# Patient Record
Sex: FEMALE | Race: OTHER | HISPANIC OR LATINO | Employment: FULL TIME | URBAN - METROPOLITAN AREA
[De-identification: names, ages, dates, MRNs, and addresses within clinical notes are randomized per-mention and may not be internally consistent; named-entity substitution may affect disease eponyms.]

---

## 2018-03-15 ENCOUNTER — HOSPITAL ENCOUNTER (EMERGENCY)
Facility: HOSPITAL | Age: 28
Discharge: HOME | End: 2018-03-15
Attending: EMERGENCY MEDICINE | Admitting: EMERGENCY MEDICINE
Payer: COMMERCIAL

## 2018-03-15 VITALS
DIASTOLIC BLOOD PRESSURE: 65 MMHG | TEMPERATURE: 98.7 F | OXYGEN SATURATION: 100 % | HEART RATE: 92 BPM | SYSTOLIC BLOOD PRESSURE: 103 MMHG | WEIGHT: 140 LBS | RESPIRATION RATE: 18 BRPM

## 2018-03-15 DIAGNOSIS — B34.9 VIRAL SYNDROME: ICD-10-CM

## 2018-03-15 DIAGNOSIS — R11.10 VOMITING: Primary | ICD-10-CM

## 2018-03-15 LAB
ANION GAP SERPL CALCULATED.3IONS-SCNC: 6 MMOL/L (ref 4–13)
BACTERIA UR QL AUTO: ABNORMAL /HPF
BASOPHILS # BLD AUTO: 0.01 THOUSANDS/ΜL (ref 0–0.1)
BASOPHILS NFR BLD AUTO: 0 % (ref 0–1)
BILIRUB UR QL STRIP: NEGATIVE
BUN SERPL-MCNC: 9 MG/DL (ref 5–25)
CALCIUM SERPL-MCNC: 8.4 MG/DL (ref 8.3–10.1)
CHLORIDE SERPL-SCNC: 103 MMOL/L (ref 100–108)
CLARITY UR: ABNORMAL
CO2 SERPL-SCNC: 29 MMOL/L (ref 21–32)
COLOR UR: YELLOW
COLOR, POC: NORMAL
CREAT SERPL-MCNC: 0.72 MG/DL (ref 0.6–1.3)
EOSINOPHIL # BLD AUTO: 0 THOUSAND/ΜL (ref 0–0.61)
EOSINOPHIL NFR BLD AUTO: 0 % (ref 0–6)
ERYTHROCYTE [DISTWIDTH] IN BLOOD BY AUTOMATED COUNT: 12.5 % (ref 11.6–15.1)
EXT PREG TEST URINE: NEGATIVE
GFR SERPL CREATININE-BSD FRML MDRD: 114 ML/MIN/1.73SQ M
GLUCOSE SERPL-MCNC: 126 MG/DL (ref 65–140)
GLUCOSE UR STRIP-MCNC: NEGATIVE MG/DL
HCT VFR BLD AUTO: 40.2 % (ref 34.8–46.1)
HGB BLD-MCNC: 13.6 G/DL (ref 11.5–15.4)
HGB UR QL STRIP.AUTO: ABNORMAL
HYALINE CASTS #/AREA URNS LPF: ABNORMAL /LPF
KETONES UR STRIP-MCNC: NEGATIVE MG/DL
LEUKOCYTE ESTERASE UR QL STRIP: NEGATIVE
LYMPHOCYTES # BLD AUTO: 0.49 THOUSANDS/ΜL (ref 0.6–4.47)
LYMPHOCYTES NFR BLD AUTO: 5 % (ref 14–44)
MCH RBC QN AUTO: 29.7 PG (ref 26.8–34.3)
MCHC RBC AUTO-ENTMCNC: 33.8 G/DL (ref 31.4–37.4)
MCV RBC AUTO: 88 FL (ref 82–98)
MONOCYTES # BLD AUTO: 0.59 THOUSAND/ΜL (ref 0.17–1.22)
MONOCYTES NFR BLD AUTO: 6 % (ref 4–12)
NEUTROPHILS # BLD AUTO: 8.95 THOUSANDS/ΜL (ref 1.85–7.62)
NEUTS SEG NFR BLD AUTO: 89 % (ref 43–75)
NITRITE UR QL STRIP: NEGATIVE
NON-SQ EPI CELLS URNS QL MICRO: ABNORMAL /HPF
NRBC BLD AUTO-RTO: 0 /100 WBCS
PH UR STRIP.AUTO: 5.5 [PH] (ref 4.5–8)
PLATELET # BLD AUTO: 216 THOUSANDS/UL (ref 149–390)
PMV BLD AUTO: 9.7 FL (ref 8.9–12.7)
POTASSIUM SERPL-SCNC: 3.5 MMOL/L (ref 3.5–5.3)
PROT UR STRIP-MCNC: ABNORMAL MG/DL
RBC # BLD AUTO: 4.58 MILLION/UL (ref 3.81–5.12)
RBC #/AREA URNS AUTO: ABNORMAL /HPF
SODIUM SERPL-SCNC: 138 MMOL/L (ref 136–145)
SP GR UR STRIP.AUTO: 1.02 (ref 1–1.03)
UROBILINOGEN UR QL STRIP.AUTO: 1 E.U./DL
WBC # BLD AUTO: 10.06 THOUSAND/UL (ref 4.31–10.16)
WBC #/AREA URNS AUTO: ABNORMAL /HPF

## 2018-03-15 PROCEDURE — 96374 THER/PROPH/DIAG INJ IV PUSH: CPT

## 2018-03-15 PROCEDURE — 96375 TX/PRO/DX INJ NEW DRUG ADDON: CPT

## 2018-03-15 PROCEDURE — 36415 COLL VENOUS BLD VENIPUNCTURE: CPT | Performed by: EMERGENCY MEDICINE

## 2018-03-15 PROCEDURE — 80048 BASIC METABOLIC PNL TOTAL CA: CPT | Performed by: EMERGENCY MEDICINE

## 2018-03-15 PROCEDURE — 81002 URINALYSIS NONAUTO W/O SCOPE: CPT | Performed by: EMERGENCY MEDICINE

## 2018-03-15 PROCEDURE — 81025 URINE PREGNANCY TEST: CPT | Performed by: EMERGENCY MEDICINE

## 2018-03-15 PROCEDURE — 81001 URINALYSIS AUTO W/SCOPE: CPT

## 2018-03-15 PROCEDURE — 85025 COMPLETE CBC W/AUTO DIFF WBC: CPT | Performed by: EMERGENCY MEDICINE

## 2018-03-15 PROCEDURE — 99283 EMERGENCY DEPT VISIT LOW MDM: CPT

## 2018-03-15 PROCEDURE — 96361 HYDRATE IV INFUSION ADD-ON: CPT

## 2018-03-15 RX ORDER — ONDANSETRON 4 MG/1
4 TABLET, FILM COATED ORAL EVERY 8 HOURS PRN
Qty: 12 TABLET | Refills: 0 | Status: SHIPPED | OUTPATIENT
Start: 2018-03-15

## 2018-03-15 RX ORDER — ONDANSETRON 2 MG/ML
4 INJECTION INTRAMUSCULAR; INTRAVENOUS ONCE
Status: COMPLETED | OUTPATIENT
Start: 2018-03-15 | End: 2018-03-15

## 2018-03-15 RX ORDER — KETOROLAC TROMETHAMINE 30 MG/ML
15 INJECTION, SOLUTION INTRAMUSCULAR; INTRAVENOUS ONCE
Status: COMPLETED | OUTPATIENT
Start: 2018-03-15 | End: 2018-03-15

## 2018-03-15 RX ADMIN — SODIUM CHLORIDE 1000 ML: 0.9 INJECTION, SOLUTION INTRAVENOUS at 21:47

## 2018-03-15 RX ADMIN — ONDANSETRON 4 MG: 2 INJECTION INTRAMUSCULAR; INTRAVENOUS at 21:47

## 2018-03-15 RX ADMIN — KETOROLAC TROMETHAMINE 15 MG: 30 INJECTION, SOLUTION INTRAMUSCULAR at 22:44

## 2018-03-16 NOTE — ED ATTENDING ATTESTATION
Viviane MOORE DO, saw and evaluated the patient  I have discussed the patient with the resident/non-physician practitioner and agree with the resident's/non-physician practitioner's findings, Plan of Care, and MDM as documented in the resident's/non-physician practitioner's note, except where noted  All available labs and Radiology studies were reviewed  At this point I agree with the current assessment done in the Emergency Department  I have conducted an independent evaluation of this patient a history and physical is as follows:      Critical Care Time  CritCare Time    Procedures     28 yr old fem to the ED with vomiting since 1 am wo diarrhea   with the same yesterday  Denies preg  Exm: slight pale  Lungs: cta  Oral moist   Abd: soft and no significant tenderness  No distention or tympany  Pln: fluids and sx tx

## 2018-03-16 NOTE — ED PROVIDER NOTES
History  Chief Complaint   Patient presents with    Vomiting     since this morning around 1 am  patient reports body aches, and chills  denies diarrhea     This is a 21-year-old female that presents today with vomiting  Sick contacts at home include her  who was seen here yesterday  She states no abdominal pain  No diarrhea constipation  States she is vomiting solids and liquids  Can't keep anything down  Denies any headaches  No lightheadedness or dizziness  No chest pain or shortness of breath  No abdominal surgeries  Denies pregnancy  States no fevers but some mild chills  21-year-old female presenting with vomiting  Will get basic labs to check for electrolytes anemia hydrate and discharge accordingly            None       No past medical history on file  No past surgical history on file  No family history on file  I have reviewed and agree with the history as documented  Social History   Substance Use Topics    Smoking status: Never Smoker    Smokeless tobacco: Never Used    Alcohol use No        Review of Systems   Constitutional: Positive for chills  Negative for fever  HENT: Negative  Eyes: Negative  Respiratory: Negative  Cardiovascular: Negative  Gastrointestinal: Positive for abdominal pain, nausea and vomiting  Negative for constipation and diarrhea  Endocrine: Negative  Genitourinary: Negative  Musculoskeletal: Negative  Skin: Negative  Neurological: Negative  Hematological: Negative  Psychiatric/Behavioral: Negative  All other systems reviewed and are negative        Physical Exam  ED Triage Vitals   Temperature Pulse Respirations Blood Pressure SpO2   03/15/18 2051 03/15/18 2051 03/15/18 2051 03/15/18 2051 03/15/18 2051   98 7 °F (37 1 °C) (!) 53 18 109/57 100 %      Temp Source Heart Rate Source Patient Position - Orthostatic VS BP Location FiO2 (%)   03/15/18 2051 03/15/18 2051 03/15/18 2051 03/15/18 2051 --   Oral Monitor Sitting Left arm       Pain Score       03/15/18 2244       5           Orthostatic Vital Signs  Vitals:    03/15/18 2051 03/15/18 2159 03/15/18 2244   BP: 109/57 99/57 103/65   Pulse: (!) 53 80 92   Patient Position - Orthostatic VS: Sitting Sitting Sitting       Physical Exam   Constitutional: She is oriented to person, place, and time  She appears well-developed and well-nourished  No distress  HENT:   Head: Normocephalic  Nose: Nose normal    Mouth/Throat: Oropharynx is clear and moist  No oropharyngeal exudate  Eyes: Conjunctivae and EOM are normal  Pupils are equal, round, and reactive to light  Neck: Normal range of motion  Neck supple  Cardiovascular: Normal rate, regular rhythm and normal heart sounds  No murmur heard  Pulmonary/Chest: Effort normal and breath sounds normal  No respiratory distress  She has no wheezes  Abdominal: Soft  Bowel sounds are normal  She exhibits no distension  There is no tenderness  There is no guarding  Musculoskeletal: Normal range of motion  She exhibits no edema, tenderness or deformity  Neurological: She is alert and oriented to person, place, and time  Skin: Skin is warm  Capillary refill takes less than 2 seconds  She is not diaphoretic  Psychiatric: She has a normal mood and affect  Vitals reviewed        ED Medications  Medications   sodium chloride 0 9 % bolus 1,000 mL (0 mL Intravenous Stopped 3/15/18 2245)   ondansetron (ZOFRAN) injection 4 mg (4 mg Intravenous Given 3/15/18 2147)   ketorolac (TORADOL) injection 15 mg (15 mg Intravenous Given 3/15/18 2244)       Diagnostic Studies  Results Reviewed     Procedure Component Value Units Date/Time    Urine Microscopic [[id number]]  (Abnormal) Collected:  03/15/18 2145    Lab Status:  Final result Specimen:  Urine from Urine, Clean Catch Updated:  03/15/18 2219     RBC, UA 4-10 (A) /hpf      WBC, UA None Seen /hpf      Epithelial Cells None Seen /hpf      Bacteria, UA None Seen /hpf      Hyaline Casts, UA 3-5 (A) /lpf     Basic metabolic panel [[id number]] Collected:  03/15/18 2146    Lab Status:  Final result Specimen:  Blood from Arm, Left Updated:  03/15/18 2211     Sodium 138 mmol/L      Potassium 3 5 mmol/L      Chloride 103 mmol/L      CO2 29 mmol/L      Anion Gap 6 mmol/L      BUN 9 mg/dL      Creatinine 0 72 mg/dL      Glucose 126 mg/dL      Calcium 8 4 mg/dL      eGFR 114 ml/min/1 73sq m     Narrative:         National Kidney Disease Education Program recommendations are as follows:  GFR calculation is accurate only with a steady state creatinine  Chronic Kidney disease less than 60 ml/min/1 73 sq  meters  Kidney failure less than 15 ml/min/1 73 sq  meters      POCT urinalysis dipstick [[id number]]  (Normal) Resulted:  03/15/18 2157    Lab Status:  Final result Specimen:  Urine Updated:  03/15/18 2157     Color, UA See chart    POCT pregnancy, urine [[id number]]  (Normal) Resulted:  03/15/18 2156    Lab Status:  Final result Updated:  03/15/18 2157     EXT PREG TEST UR (Ref: Negative) Negative    CBC and differential [[id number]]  (Abnormal) Collected:  03/15/18 2146    Lab Status:  Final result Specimen:  Blood from Arm, Left Updated:  03/15/18 2156     WBC 10 06 Thousand/uL      RBC 4 58 Million/uL      Hemoglobin 13 6 g/dL      Hematocrit 40 2 %      MCV 88 fL      MCH 29 7 pg      MCHC 33 8 g/dL      RDW 12 5 %      MPV 9 7 fL      Platelets 456 Thousands/uL      nRBC 0 /100 WBCs      Neutrophils Relative 89 (H) %      Lymphocytes Relative 5 (L) %      Monocytes Relative 6 %      Eosinophils Relative 0 %      Basophils Relative 0 %      Neutrophils Absolute 8 95 (H) Thousands/µL      Lymphocytes Absolute 0 49 (L) Thousands/µL      Monocytes Absolute 0 59 Thousand/µL      Eosinophils Absolute 0 00 Thousand/µL      Basophils Absolute 0 01 Thousands/µL     ED Urine Macroscopic [[id number]]  (Abnormal) Collected:  03/15/18 2145    Lab Status:  Final result Specimen:  Urine Updated:  03/15/18 2145     Color, UA Yellow Clarity, UA Slightly Cloudy     pH, UA 5 5     Leukocytes, UA Negative     Nitrite, UA Negative     Protein, UA Trace (A) mg/dl      Glucose, UA Negative mg/dl      Ketones, UA Negative mg/dl      Urobilinogen, UA 1 0 E U /dl      Bilirubin, UA Negative     Blood, UA Moderate (A)     Specific [place], UA 1 025    Narrative:       CLINITEK RESULT                 No orders to display         Procedures  Procedures      Phone Consults  ED Phone Contact    ED Course  ED Course as of Mar 16 0018   Thu Mar 15, 2018   2230 States feeling better, will discharge                                 MDM  CritCare Time    Disposition  Final diagnoses:   Vomiting   Viral syndrome     Time reflects when diagnosis was documented in both MDM as applicable and the Disposition within this note     Time User Action Codes Description Comment    3/15/2018 10:21 PM Justin Isabel  8  [R11 10] Vomiting     3/15/2018 10:21 PM Justin Isabel  8  [B34 9] Viral syndrome       ED Disposition     ED Disposition Condition Comment    Discharge  Kesha Riveralubna discharge to home/self care  Condition at discharge: Good        Follow-up Information     Follow up With Specialties Details Why Contact Info    [facility]  Schedule an appointment as soon as possible for a visit As needed, If symptoms worsen [phone]          Discharge Medication List as of 3/15/2018 10:21 PM      START taking these medications    Details   ondansetron (ZOFRAN) 4 mg tablet Take 1 tablet (4 mg total) by mouth every 8 (eight) hours as needed for nausea or vomiting, Starting Thu 3/15/2018, Print           No discharge procedures on file  ED Provider  Attending physically available and evaluated Kesha Riveralubna  I managed the patient along with the ED Attending      Electronically Signed by         Ollie Cornelius MD  03/16/18 1678

## 2018-03-16 NOTE — DISCHARGE INSTRUCTIONS
Náuseas y vómitos agudos   CUIDADO AMBULATORIO:   Náuseas y vómitos agudos  comienzan de forma repentina, [place] June rápidamente y arce un período breve de Cedric  Las causas comunes incluyen embarazo, alcohol, infección y medicamentos  Betsy Jester en la arline, un ataque al corazón o el desequilibrio del oído interno también pueden causar náuseas y vómitos agudos  Busque atención médica de inmediato si:   · Usted nota slick en ellison vómito o en sandra evacuaciones  · Usted siente un dolor súbito e intenso en el pecho y la parte superior de ellison abdomen después de tener vómitos trixie o tratar de vomitar  · Usted tiene el tuan y el pecho inflamados  · [employer], tiene frío, sed y sequedad en los ojos y la boca  · Usted está orinando muy poco o nada en absoluto  · Usted tiene debilidad muscular, calambres en las piernas y dificultad para respirar  · Ellison corazón late más rápido que de costumbre  · Usted continúa vomitando por más de 48 horas  Pregúntele a ellison [place] vitaminas y minerales son adecuados para usted  · Usted tiene arcadas secas (vómitos sin que salga nada) frecuentes  · Sandra náusea y vómitos no mejoran ni desaparecen después de usar el medicamento  · Usted tiene preguntas o inquietudes acerca de ellison condición o tratamiento  El tratamiento para las náuseas y vómitos agudos  puede incluir la administración de medicamentos para calmar ellison estómago y detener los vómitos  Es posible que deban administrarle líquidos por vía intravenosa si usted está deshidratado  [place]ite o controle las náuseas y los vómitos agudos:   · No consuma alcohol  El alcohol podría causarle malestar o irritación estomacal  Evi cantidad elevada de alcohol también puede causar náuseas y vómitos agudos  · Controle el estrés  Los home de [place] que son el resultado de tensión nerviosa pueden causar náuseas y vómitos  Busque la manera de relajarse y controlar el estrés   Descanse y duerma más     · [place] más líquidos zenon se le indique  Los vómitos pueden llevar a la deshidratación  Es importante beber más líquidos para ayudar a reemplazar los fluidos corporales perdidos  Pregunte a garcía médico sobre la cantidad de líquido que necesita stuart todos los días y cuáles le recomienda  García médico podría recomendarle que tome charlie solución de rehidratación oral (SRO)  Las soluciones de rehidratación oral contienen la cantidad [facility] de [place], sales y azúcar que necesita para restituir los líquidos que perdió garcía organismo  Pregunte qué tipo de solución de rehidratación oral debe usar, qué cantidad debe stuart y dónde puede obtenerla  · Ingiera comidas más pequeñas, más a menudo  Coma pequeñas cantidades de comida cada 2 o 3 horas, incluso si no tiene hambre  Ernie náuseas podrían disminuir si tiene comida en el estómago  · Hable con garcía médico antes de stuart medicamentos de [facility]  Estos medicamentos pueden causar problemas serios si los [place] junto con ciertos medicamentos o si tiene determinadas condiciones médicas  Podrían tener problemas si [place] charlie dosis demasiado johnny o los [place] paola más tiempo de lo indicado  Siga las indicaciones de la etiqueta al pie de la [place]  Acuda a ernie consultas de control con garcía médico según le indicaron  Anote ernie preguntas para que se acuerde de hacerlas paola ernie visitas  © 2017 [address] [employer] [employer] Information is for End User's use only and may not be sold, redistributed or otherwise used for commercial purposes  All illustrations and images included in CareNotes® are the copyrighted property of A D A M , Inc  or [address]  Esta información es sólo para uso en educación  García intención no es darle un consejo médico sobre enfermedades o tratamientos  Colsulte con garcía Maylin Shouts farmacéutico antes de seguir cualquier régimen médico para saber si es seguro y efectivo para usted